# Patient Record
Sex: MALE | Race: WHITE | Employment: OTHER | URBAN - METROPOLITAN AREA
[De-identification: names, ages, dates, MRNs, and addresses within clinical notes are randomized per-mention and may not be internally consistent; named-entity substitution may affect disease eponyms.]

---

## 2017-01-16 ENCOUNTER — OFFICE VISIT (OUTPATIENT)
Dept: INTERNAL MEDICINE CLINIC | Age: 62
End: 2017-01-16

## 2017-01-16 ENCOUNTER — TELEPHONE (OUTPATIENT)
Dept: INTERNAL MEDICINE CLINIC | Age: 62
End: 2017-01-16

## 2017-01-16 VITALS
HEART RATE: 75 BPM | OXYGEN SATURATION: 97 % | BODY MASS INDEX: 31.61 KG/M2 | DIASTOLIC BLOOD PRESSURE: 71 MMHG | HEIGHT: 68 IN | TEMPERATURE: 98.5 F | SYSTOLIC BLOOD PRESSURE: 115 MMHG | WEIGHT: 208.6 LBS

## 2017-01-16 DIAGNOSIS — R00.2 PALPITATION: Primary | ICD-10-CM

## 2017-01-16 DIAGNOSIS — R07.89 OTHER CHEST PAIN: ICD-10-CM

## 2017-01-16 NOTE — TELEPHONE ENCOUNTER
I called and spoke with patient. Pt scheduled for 1030 today with Dr Darin Bright for htn and dizziness.

## 2017-01-16 NOTE — PROGRESS NOTES
HISTORY OF PRESENT ILLNESS  Kendra Holguin is a 64 y.o. male. HPI   Had episode os dizziness and syncope yesterday  Passed out, EMT arrived. BP was high  Pt declined ED evaluation. bp was 174/84 and felt flushed  Was dizzy when he awoke that morning and was stressed and took a valium tab  Was breathing heavy yesterday  Has had palpitations intermittently x months with vague pressure sensation  Has been anxous and stressed about some business deals and has had to take valium prn     Patient Active Problem List    Diagnosis Date Noted    PATRICIA (obstructive sleep apnea) 12/06/2009    IBS (irritable bowel syndrome) 12/06/2009    Diverticulosis 12/06/2009    Overweight(278.02) 12/06/2009    PUD (peptic ulcer disease) 12/06/2009    Rectal fissure 12/06/2009    Acne 12/06/2009     Current Outpatient Prescriptions   Medication Sig Dispense Refill    tadalafil (CIALIS) 5 mg tablet Take 1 Tab by mouth as needed. 30 Tab 11    zolpidem (AMBIEN) 10 mg tablet TAKE 1 TABLET BY MOUTH EVERY NIGHT AT BEDTIME AS NEEDED 30 Tab 5    diazepam (VALIUM) 5 mg tablet Take 1 Tab by mouth every six (6) hours as needed for Anxiety. Max Daily Amount: 20 mg. 30 Tab 1    cholecalciferol, vitamin D3, (VITAMIN D3) 2,000 unit tab Take  by mouth.  omega-3 fatty acids-vitamin e (FISH OIL) 1,000 mg cap Take 1 Cap by mouth.  zinc 100 mg Tab Take  by mouth.  magnesium 100 mg Cap Take  by mouth.  calcium 100 mg Cap Take  by mouth.  multivitamin, stress formula (STRESS TAB) tablet Take 1 Tab by mouth daily.        No Known Allergies   Lab Results  Component Value Date/Time   Hemoglobin A1c 5.5 02/07/2013 10:22 AM   Hemoglobin A1c, External 5.6 08/28/2015   Glucose 88 05/08/2014 11:42 AM   LDL, calculated 84 05/08/2014 11:42 AM   Creatinine 0.76 05/08/2014 11:42 AM      Lab Results  Component Value Date/Time   Cholesterol, total 159 05/08/2014 11:42 AM   HDL Cholesterol 62 05/08/2014 11:42 AM   LDL, calculated 84 05/08/2014 11:42 AM   Triglyceride 66 05/08/2014 11:42 AM       Lab Results  Component Value Date/Time   GFR est  05/08/2014 11:42 AM   GFR est non- 05/08/2014 11:42 AM   Creatinine 0.76 05/08/2014 11:42 AM   BUN 13 05/08/2014 11:42 AM   Sodium 140 05/08/2014 11:42 AM   Potassium 5.0 05/08/2014 11:42 AM   Chloride 102 05/08/2014 11:42 AM   CO2 27 05/08/2014 11:42 AM         ROS    Physical Exam   Constitutional: He appears well-developed and well-nourished. No distress. Appears stated age   HENT:   Head: Normocephalic. Cardiovascular: Normal rate, regular rhythm and normal heart sounds. Exam reveals no gallop and no friction rub. No murmur heard. Pulmonary/Chest: Effort normal and breath sounds normal. No respiratory distress. He has no wheezes. He has no rales. He exhibits no tenderness. Abdominal: Soft. Musculoskeletal: He exhibits no edema. Neurological: He is alert. Psychiatric: He has a normal mood and affect. Nursing note and vitals reviewed. ASSESSMENT and PLAN  Loulou Borrego was seen today for hypertension and dizziness.     Diagnoses and all orders for this visit:    Palpitation  -     AMB POC EKG ROUTINE W/ 12 LEADS, INTER & REP--nsr, NST  -     REFERRAL TO CARDIOLOGY   Possible all r/t anxiety but should getr stress test/echo   Pt will go to ED for any CP, increased SOB or recurrent syncope    Other chest pain  -     AMB POC EKG ROUTINE W/ 12 LEADS, INTER & REP  -     REFERRAL TO CARDIOLOGY      Follow-up Disposition: Not on File

## 2017-01-16 NOTE — TELEPHONE ENCOUNTER
Pt had EMT come to home 1-15-17 for htn & dizziness. Pt didn't go to ED, but would like to be seen today by DR. Tc Connolly. I have his scheduled for tomorrow, but he would like to come in today if anything opens up.

## 2017-01-16 NOTE — MR AVS SNAPSHOT
Visit Information Date & Time Provider Department Dept. Phone Encounter #  
 1/16/2017 10:30 AM Pola Malhotra, [address] [phone] [id number] Your Appointments 6/1/2017 11:00 AM  
ROUTINE CARE with Pola Malhotra, [address] [facility]) Appt Note: 6 month follow up bp  
 [address] [address]  
[address] [facility] [address] [place] [address] Upcoming Health Maintenance Date Due DTaP/Tdap/Td series (2 - Td) 2/7/2024 COLONOSCOPY 2/10/2025 Allergies as of 1/16/2017  Review Complete On: 1/16/2017 By: Emmett Champion LPN No Known Allergies Current Immunizations  Reviewed on 2/7/2014 Name Date Influenza Vaccine 10/26/2014 Influenza Vaccine (Quad) PF 12/15/2015 Influenza Vaccine PF 10/29/2013 Influenza Vaccine Split 12/9/2011 Influenza Vaccine Whole 11/2/2007 Tdap 2/7/2014 Zoster Vaccine, Live 11/7/2013 Not reviewed this visit You Were Diagnosed With   
  
 Codes Comments Palpitation    -  Primary ICD-10-CM: R00.2 ICD-9-CM: 785.1 Other chest pain     ICD-10-CM: R07.89 ICD-9-CM: 786.59 Vitals BP Pulse Temp Height(growth percentile) Weight(growth percentile) SpO2  
 115/71 (BP 1 Location: Left arm, BP Patient Position: Sitting) 75 98.5 °F (36.9 °C) (Oral) 5' 8\" (1.727 m) 208 lb 9.6 oz (94.6 kg) 97% BMI Smoking Status 31.72 kg/m2 Former Smoker BMI and BSA Data Body Mass Index Body Surface Area 31.72 kg/m 2 2.13 m 2 Preferred Pharmacy Pharmacy Name Phone [phone] - [phone], [address] AT [facility] 91 [phone] Your Updated Medication List  
  
   
This list is accurate as of: 1/16/17 11:46 AM.  Always use your most recent med list.  
  
  
  
  
 calcium 100 mg Cap Take  by mouth. diazePAM 5 mg tablet Commonly known as:  VALIUM Take 1 Tab by mouth every six (6) hours as needed for Anxiety. Max Daily Amount: 20 mg. FISH OIL 1,000 mg Cap Generic drug:  omega-3 fatty acids-vitamin e Take 1 Cap by mouth.  
  
 magnesium 100 mg Cap Take  by mouth.  
  
 multivitamin, stress formula tablet Commonly known as:  STRESS TAB Take 1 Tab by mouth daily. tadalafil 5 mg tablet Commonly known as:  CIALIS Take 1 Tab by mouth as needed. VITAMIN D3 2,000 unit Tab Generic drug:  cholecalciferol (vitamin D3) Take  by mouth. zinc 100 mg Tab Take  by mouth.  
  
 zolpidem 10 mg tablet Commonly known as:  AMBIEN  
TAKE 1 TABLET BY MOUTH EVERY NIGHT AT BEDTIME AS NEEDED We Performed the Following AMB POC EKG ROUTINE W/ 12 LEADS, INTER & REP [67564 CPT(R)] REFERRAL TO CARDIOLOGY [VVB69 Custom] Comments:  
 Please evaluate patient for chest pain , palpitations. syncope Referral Information Referral ID Referred By Referred To  
  
 [id number] JENNIFER KOWALSKI MD   
   [address] [address] Phone: [phone] Fax: [phone] Visits Status Start Date End Date 1 New Request 1/16/17 1/16/18 If your referral has a status of pending review or denied, additional information will be sent to support the outcome of this decision. Introducing [facility]! [employer] introduces [employer] patient portal. Now you can access parts of your medical record, email your doctor's office, and request medication refills online. 1. In your internet browser, go to [email or web address] 2. Click on the First Time User? Click Here link in the Sign In box. You will see the New Member Sign Up page. 3. Enter your [employer] Access Code exactly as it appears below. You will not need to use this code after youve completed the sign-up process.  If you do not sign up before the expiration date, you must request a new code. · [employer] Access Code: [email or web address] Expires: 3/2/2017 12:22 PM 
 
4. Enter the last four digits of your Social Security Number (xxxx) and Date of Birth (mm/dd/yyyy) as indicated and click Submit. You will be taken to the next sign-up page. 5. Create a [employer] ID. This will be your [employer] login ID and cannot be changed, so think of one that is secure and easy to remember. 6. Create a [employer] password. You can change your password at any time. 7. Enter your Password Reset Question and Answer. This can be used at a later time if you forget your password. 8. Enter your e-mail address. You will receive e-mail notification when new information is available in [address] 9. Click Sign Up. You can now view and download portions of your medical record. 10. Click the Download Summary menu link to download a portable copy of your medical information. If you have questions, please visit the Frequently Asked Questions section of the [employer] website. Remember, [employer] is NOT to be used for urgent needs. For medical emergencies, dial 911. Now available from your iPhone and Android! Please provide this summary of care documentation to your next provider. Your primary care clinician is listed as Christen KOWALSKI. If you have any questions after today's visit, please call [phone].

## 2017-01-25 ENCOUNTER — HOSPITAL ENCOUNTER (OUTPATIENT)
Dept: NON INVASIVE DIAGNOSTICS | Age: 62
Discharge: HOME | End: 2017-01-25
Attending: INTERNAL MEDICINE
Payer: COMMERCIAL

## 2017-01-25 DIAGNOSIS — R55 SYNCOPE: ICD-10-CM

## 2017-01-25 PROCEDURE — 93225 XTRNL ECG REC<48 HRS REC: CPT

## 2017-01-27 NOTE — PROCEDURES
Norris Shiva Weaver, [address]   [place]       Name:  Cesar Zapata   MR#:  [id number]   :  1955   Account #:  [de-identified]        Date of Adm:  2017       PROCEDURE: Holter monitor. FINDINGS: Holter monitor was carried out between 2017 and   2017. The patient did not return a diary or report symptoms. During the recording, the patient had a minimum heart rate of 56 beats   per minute and an average heart rate of 77 beats per minute with a   maximum heart rate of 112 beats per minute. There were no significant   pauses. There were isolated premature ventricular contractions   numbering 126 and isolated supraventricular beats of 33 beats per   minute. The patient was studied because of syncope. There were no   electrocardiographic causes for syncope. SUMMARY:   1. Regular sinus rhythm is observed. 2. Isolated premature ventricular contractions. 3. Atrial premature contractions. 4. No electrocardiographic cause for the patient's syncope.         MD EVON Sanchez / ISAIAS   D:  2017   07:16   T:  2017   12:45   Job #:  [id number]

## 2017-03-13 RX ORDER — TADALAFIL 5 MG/1
5 TABLET ORAL AS NEEDED
Qty: 90 TAB | Refills: 3 | Status: SHIPPED | OUTPATIENT
Start: 2017-03-13

## 2017-03-13 NOTE — TELEPHONE ENCOUNTER
Patient states he needs a call back in reference to getting a prescription for Cialis 20 mg's for a 90 day supply. Patient requests a Hard Copy so he can have filled in [place] as patient states it's cheaper there. Please call to discuss & advise.  Thank you

## 2017-03-13 NOTE — TELEPHONE ENCOUNTER
I called and spoke with patient. Pt states that he would like a 90-day supply. He states that he will pay out of pocket for medication if needed. Pt requesting 90 tablets as he takes this daily. Rx pended. Please PRINT rx.

## 2017-04-12 ENCOUNTER — OFFICE VISIT (OUTPATIENT)
Dept: INTERNAL MEDICINE CLINIC | Age: 62
End: 2017-04-12

## 2017-04-12 VITALS
HEIGHT: 68 IN | SYSTOLIC BLOOD PRESSURE: 115 MMHG | HEART RATE: 64 BPM | WEIGHT: 216 LBS | TEMPERATURE: 98.1 F | DIASTOLIC BLOOD PRESSURE: 67 MMHG | BODY MASS INDEX: 32.74 KG/M2 | RESPIRATION RATE: 16 BRPM

## 2017-04-12 DIAGNOSIS — F51.01 PRIMARY INSOMNIA: ICD-10-CM

## 2017-04-12 DIAGNOSIS — R10.11 RUQ PAIN: ICD-10-CM

## 2017-04-12 DIAGNOSIS — J06.9 UPPER RESPIRATORY TRACT INFECTION, UNSPECIFIED TYPE: Primary | ICD-10-CM

## 2017-04-12 RX ORDER — AZITHROMYCIN 250 MG/1
250 TABLET, FILM COATED ORAL SEE ADMIN INSTRUCTIONS
Qty: 6 TAB | Refills: 0 | Status: SHIPPED | OUTPATIENT
Start: 2017-04-12 | End: 2017-04-17

## 2017-04-12 RX ORDER — LORATADINE 10 MG/1
10 TABLET ORAL
COMMUNITY

## 2017-04-12 RX ORDER — ZOLPIDEM TARTRATE 10 MG/1
TABLET ORAL
Qty: 30 TAB | Refills: 5 | Status: SHIPPED | OUTPATIENT
Start: 2017-04-12 | End: 2018-03-21 | Stop reason: SDUPTHER

## 2017-04-12 NOTE — MR AVS SNAPSHOT
Visit Information Date & Time Provider Department Dept. Phone Encounter #  
 4/12/2017 11:15 AM Sandra Hsu, [address] [facility] [phone] [id number] Follow-up Instructions Return if symptoms worsen or fail to improve. Your Appointments 6/1/2017 11:00 AM  
ROUTINE CARE with Sandra Hsu, [facility] [facility]) Appt Note: 6 month follow up bp  
 [facility] [address] [address]  
[address] [facility] [address] [place] [address] Upcoming Health Maintenance Date Due DTaP/Tdap/Td series (2 - Td) 2/7/2024 COLONOSCOPY 2/10/2025 Allergies as of 4/12/2017  Review Complete On: 4/12/2017 By: Samuel Norris LPN No Known Allergies Current Immunizations  Reviewed on 2/7/2014 Name Date Influenza Vaccine 10/26/2014 Influenza Vaccine (Quad) PF 12/15/2015 Influenza Vaccine PF 10/29/2013 Influenza Vaccine Split 12/9/2011 Influenza Vaccine Whole 11/2/2007 Tdap 2/7/2014 Zoster Vaccine, Live 11/7/2013 Not reviewed this visit You Were Diagnosed With   
  
 Codes Comments Upper respiratory tract infection, unspecified type    -  Primary ICD-10-CM: J06.9 ICD-9-CM: 465.9 Primary insomnia     ICD-10-CM: F51.01 
ICD-9-CM: 307.42   
 RUQ pain     ICD-10-CM: R10.11 ICD-9-CM: 789.01 Vitals BP Pulse Temp Resp Height(growth percentile) Weight(growth percentile)  
 115/67 (BP 1 Location: Left arm, BP Patient Position: Sitting) 64 98.1 °F (36.7 °C) (Oral) 16 5' 8\" (1.727 m) 216 lb (98 kg) BMI Smoking Status 32.84 kg/m2 Former Smoker BMI and BSA Data Body Mass Index Body Surface Area  
 32.84 kg/m 2 2.17 m 2 Preferred Pharmacy Pharmacy Name Phone [phone], [facility] [place] [address] [phone] Your Updated Medication List  
  
   
This list is accurate as of: 4/12/17 11:54 AM.  Always use your most recent med list.  
  
  
  
  
 azithromycin 250 mg tablet Commonly known as:  Yang Chavez Take 1 Tab by mouth See Admin Instructions for 5 days. calcium 100 mg Cap Take  by mouth. diazePAM 5 mg tablet Commonly known as:  VALIUM Take 1 Tab by mouth every six (6) hours as needed for Anxiety. Max Daily Amount: 20 mg. FISH OIL 1,000 mg Cap Generic drug:  omega-3 fatty acids-vitamin e Take 1 Cap by mouth.  
  
 loratadine 10 mg tablet Commonly known as:  Suresh Hummer Take 10 mg by mouth.  
  
 magnesium 100 mg Cap Take  by mouth.  
  
 multivitamin, stress formula tablet Commonly known as:  STRESS TAB Take 1 Tab by mouth daily. tadalafil 5 mg tablet Commonly known as:  CIALIS Take 1 Tab by mouth as needed. VITAMIN D3 2,000 unit Tab Generic drug:  cholecalciferol (vitamin D3) Take  by mouth. zinc 100 mg Tab Take  by mouth.  
  
 zolpidem 10 mg tablet Commonly known as:  AMBIEN  
TAKE 1 TABLET BY MOUTH EVERY NIGHT AT BEDTIME AS NEEDED Prescriptions Printed Refills  
 zolpidem (AMBIEN) 10 mg tablet 5 Sig: TAKE 1 TABLET BY MOUTH EVERY NIGHT AT BEDTIME AS NEEDED Class: Print Prescriptions Sent to Pharmacy Refills  
 azithromycin (ZITHROMAX) 250 mg tablet 0 Sig: Take 1 Tab by mouth See Admin Instructions for 5 days. Class: Normal  
 Pharmacy: [facility] [address] [place] [address] #: [phone] Route: Oral  
  
Follow-up Instructions Return if symptoms worsen or fail to improve. To-Do List   
 04/17/2017 Imaging:  US ABD COMP Introducing [facility]!    
 [employer] introduces [employer] patient portal. Now you can access parts of your medical record, email your doctor's office, and request medication refills online. 1. In your internet browser, go to [email or web address] 2. Click on the First Time User? Click Here link in the Sign In box. You will see the New Member Sign Up page. 3. Enter your "[employer] Access Code exactly as it appears below. You will not need to use this code after youve completed the sign-up process. If you do not sign up before the expiration date, you must request a new code. · "[employer] Access Code: [email or web address] Expires: 7/11/2017 11:53 AM 
 
4. Enter the last four digits of your Social Security Number (xxxx) and Date of Birth (mm/dd/yyyy) as indicated and click Submit. You will be taken to the next sign-up page. 5. Create a "[employer] ID. This will be your "[employer] login ID and cannot be changed, so think of one that is secure and easy to remember. 6. Create a "[employer] password. You can change your password at any time. 7. Enter your Password Reset Question and Answer. This can be used at a later time if you forget your password. 8. Enter your e-mail address. You will receive e-mail notification when new information is available in [address] 9. Click Sign Up. You can now view and download portions of your medical record. 10. Click the Download Summary menu link to download a portable copy of your medical information. If you have questions, please visit the Frequently Asked Questions section of the "[employer] website. Remember, "[employer] is NOT to be used for urgent needs. For medical emergencies, dial 911. Now available from your iPhone and Android! Please provide this summary of care documentation to your next provider. Your primary care clinician is listed as Everardo KOWALSKI. If you have any questions after today's visit, please call [phone].

## 2017-04-12 NOTE — PROGRESS NOTES
HISTORY OF PRESENT ILLNESS  Kristin Jj is a 64 y.o. male. HPI     C/o sick x 10d  C/o fatigue, right ear feels stopped up and cough with green phlegm  flonase and mucinex and cough med which helps some  No f/c sob sxs  Still has intermittent RUQ pain and wants to get the US done that was ordered last year--needs new order. Patient Active Problem List    Diagnosis Date Noted    PATRICIA (obstructive sleep apnea) 12/06/2009    IBS (irritable bowel syndrome) 12/06/2009    Diverticulosis 12/06/2009    Overweight 12/06/2009    PUD (peptic ulcer disease) 12/06/2009    Rectal fissure 12/06/2009    Acne 12/06/2009     Current Outpatient Prescriptions   Medication Sig Dispense Refill    loratadine (CLARITIN) 10 mg tablet Take 10 mg by mouth.  zolpidem (AMBIEN) 10 mg tablet TAKE 1 TABLET BY MOUTH EVERY NIGHT AT BEDTIME AS NEEDED 30 Tab 5    azithromycin (ZITHROMAX) 250 mg tablet Take 1 Tab by mouth See Admin Instructions for 5 days. 6 Tab 0    tadalafil (CIALIS) 5 mg tablet Take 1 Tab by mouth as needed. 90 Tab 3    diazepam (VALIUM) 5 mg tablet Take 1 Tab by mouth every six (6) hours as needed for Anxiety. Max Daily Amount: 20 mg. 30 Tab 1    cholecalciferol, vitamin D3, (VITAMIN D3) 2,000 unit tab Take  by mouth.  omega-3 fatty acids-vitamin e (FISH OIL) 1,000 mg cap Take 1 Cap by mouth.  zinc 100 mg Tab Take  by mouth.  magnesium 100 mg Cap Take  by mouth.  calcium 100 mg Cap Take  by mouth.  multivitamin, stress formula (STRESS TAB) tablet Take 1 Tab by mouth daily. No Known Allergies        ROS    Physical Exam   Constitutional: He appears well-developed and well-nourished. No distress. Appears stated age   HENT:   Head: Normocephalic. Mouth/Throat: Oropharynx is clear and moist.   Neck: Normal range of motion. Cardiovascular: Normal rate, regular rhythm and normal heart sounds. Exam reveals no gallop and no friction rub. No murmur heard.   Pulmonary/Chest: Effort normal and breath sounds normal. No respiratory distress. He has no wheezes. He has no rales. He exhibits no tenderness. Abdominal: Soft. Musculoskeletal: He exhibits no edema. Lymphadenopathy:     He has no cervical adenopathy. Neurological: He is alert. Psychiatric: He has a normal mood and affect. Nursing note and vitals reviewed. ASSESSMENT and PLAN  Rommel Downey was seen today for cough, fatigue, dry eye and ear fullness. Diagnoses and all orders for this visit:    Upper respiratory tract infection, unspecified type    Primary insomnia  -     zolpidem (AMBIEN) 10 mg tablet; TAKE 1 TABLET BY MOUTH EVERY NIGHT AT BEDTIME AS NEEDED    RUQ pain  -     US ABD COMP; Future    Other orders  -     azithromycin (ZITHROMAX) 250 mg tablet; Take 1 Tab by mouth See Admin Instructions for 5 days. Follow-up Disposition:  Return if symptoms worsen or fail to improve.

## 2017-04-12 NOTE — PROGRESS NOTES
Reviewed record in preparation for visit and have obtained necessary documentation. Identified pt with two pt identifiers(name and ). Chief Complaint   Patient presents with    Cough     productive    Fatigue    Dry Eye     bilateral    Ear Fullness     right feels clogged       There are no preventive care reminders to display for this patient. Mr. Farzaneh Vega has a reminder for a \"due or due soon\" health maintenance. I have asked that he discuss health maintenance topic(s) due with His  primary care provider. Coordination of Care Questionnaire:  :     1) Have you been to an emergency room, urgent care clinic since your last visit? no   Hospitalized since your last visit? no             2) Have you seen or consulted any other health care providers outside of [facility] since your last visit? no  (Include any pap smears or colon screenings in this section.)      Patient is accompanied by self I have received verbal consent from Benoit Palacio to discuss any/all medical information while they are present in the room.

## 2017-06-15 ENCOUNTER — OFFICE VISIT (OUTPATIENT)
Dept: INTERNAL MEDICINE CLINIC | Age: 62
End: 2017-06-15

## 2017-06-15 VITALS
TEMPERATURE: 97.6 F | HEART RATE: 63 BPM | DIASTOLIC BLOOD PRESSURE: 71 MMHG | OXYGEN SATURATION: 96 % | SYSTOLIC BLOOD PRESSURE: 129 MMHG | HEIGHT: 68 IN | BODY MASS INDEX: 32.13 KG/M2 | WEIGHT: 212 LBS

## 2017-06-15 DIAGNOSIS — F41.9 ANXIETY: ICD-10-CM

## 2017-06-15 DIAGNOSIS — G47.33 OSA (OBSTRUCTIVE SLEEP APNEA): ICD-10-CM

## 2017-06-15 DIAGNOSIS — K76.0 FATTY LIVER: ICD-10-CM

## 2017-06-15 DIAGNOSIS — E66.9 OBESITY, UNSPECIFIED OBESITY SEVERITY, UNSPECIFIED OBESITY TYPE: Primary | ICD-10-CM

## 2017-06-15 RX ORDER — DIAZEPAM 5 MG/1
5 TABLET ORAL
Qty: 30 TAB | Refills: 1 | Status: SHIPPED | OUTPATIENT
Start: 2017-06-15 | End: 2018-03-21 | Stop reason: SDUPTHER

## 2017-06-15 NOTE — PROGRESS NOTES
HISTORY OF PRESENT ILLNESS  Chi Ruvalcaba is a 64 y.o. male. HPI     F/u syncope and RUQ pain  Had stress echo and echo and holter-negative and visit with Dr Sofia Gonzales  No recurrence of palpitations and syncope  Wants to see sleep med MD for PATRICIA and wants to see Dr Musa Gutierrez and [address] abdomen was done to RUW pain--fatty liver only      Patient Active Problem List    Diagnosis Date Noted    Fatty liver 06/15/2017    PATRICIA (obstructive sleep apnea) 12/06/2009    IBS (irritable bowel syndrome) 12/06/2009    Diverticulosis 12/06/2009    Overweight 12/06/2009    PUD (peptic ulcer disease) 12/06/2009    Rectal fissure 12/06/2009    Acne 12/06/2009     Current Outpatient Prescriptions   Medication Sig Dispense Refill    diazePAM (VALIUM) 5 mg tablet Take 1 Tab by mouth every six (6) hours as needed for Anxiety. Max Daily Amount: 20 mg. 30 Tab 1    zolpidem (AMBIEN) 10 mg tablet TAKE 1 TABLET BY MOUTH EVERY NIGHT AT BEDTIME AS NEEDED 30 Tab 5    cholecalciferol, vitamin D3, (VITAMIN D3) 2,000 unit tab Take  by mouth.  omega-3 fatty acids-vitamin e (FISH OIL) 1,000 mg cap Take 1 Cap by mouth.  zinc 100 mg Tab Take  by mouth.  calcium 100 mg Cap Take  by mouth.  multivitamin, stress formula (STRESS TAB) tablet Take 1 Tab by mouth daily.  loratadine (CLARITIN) 10 mg tablet Take 10 mg by mouth.  tadalafil (CIALIS) 5 mg tablet Take 1 Tab by mouth as needed. 90 Tab 3    magnesium 100 mg Cap Take  by mouth.        No Known Allergies   Lab Results  Component Value Date/Time   WBC 8.1 05/08/2014 11:42 AM   HGB 15.7 05/08/2014 11:42 AM   HCT 45.9 05/08/2014 11:42 AM   PLATELET 891 17/61/2805 11:42 AM   MCV 90 05/08/2014 11:42 AM     Lab Results  Component Value Date/Time   Cholesterol, total 159 05/08/2014 11:42 AM   HDL Cholesterol 62 05/08/2014 11:42 AM   LDL, calculated 84 05/08/2014 11:42 AM   LDL-C, External 105 08/28/2015   Triglyceride 66 05/08/2014 11:42 AM     Lab Results  Component Value Date/Time   ALT (SGPT) 22 05/08/2014 11:42 AM   AST (SGOT) 22 05/08/2014 11:42 AM   Alk. phosphatase 74 05/08/2014 11:42 AM   Bilirubin, total 0.4 05/08/2014 11:42 AM   Albumin 4.2 05/08/2014 11:42 AM   Protein, total 6.4 05/08/2014 11:42 AM   INR 1.0 07/16/2009 10:30 AM   Prothrombin time 10.1 07/16/2009 10:30 AM   PLATELET 754 12/11/1431 11:42 AM       Lab Results  Component Value Date/Time   GFR est non- 05/08/2014 11:42 AM   GFR est  05/08/2014 11:42 AM   Creatinine 0.76 05/08/2014 11:42 AM   BUN 13 05/08/2014 11:42 AM   Sodium 140 05/08/2014 11:42 AM   Potassium 5.0 05/08/2014 11:42 AM   Chloride 102 05/08/2014 11:42 AM   CO2 27 05/08/2014 11:42 AM        ROS    Physical Exam   Constitutional: He appears well-developed and well-nourished. No distress. Appears stated age   HENT:   Head: Normocephalic. Cardiovascular: Normal rate and regular rhythm. Exam reveals no gallop and no friction rub. No murmur heard. Pulmonary/Chest: Effort normal and breath sounds normal. No respiratory distress. He has no wheezes. He has no rales. He exhibits no tenderness. Abdominal: Soft. Musculoskeletal: He exhibits no edema. Neurological: He is alert. Psychiatric: He has a normal mood and affect. Nursing note and vitals reviewed. ASSESSMENT and PLAN  Wang Patel was seen today for hypertension, generalized body aches and fatigue. Diagnoses and all orders for this visit:    Obesity, unspecified obesity severity, unspecified obesity type  -     REFERRAL TO DIETITIAN    PATRICIA (obstructive sleep apnea)  -     SLEEP MEDICINE REFERRAL-may need new CPAP machine    Fatty liver   Normal LFT's   See dietician and work on further weight reduction    Anxiety   Refill valium, use sparingly-discussed  Other orders  -     Cancel: REFERRAL TO GENERAL SURGERY  -     diazePAM (VALIUM) 5 mg tablet; Take 1 Tab by mouth every six (6) hours as needed for Anxiety.  Max Daily Amount: 20 mg.      Follow-up Disposition:  Return in about 1 year (around 6/15/2018) for cpe.

## 2017-06-15 NOTE — MR AVS SNAPSHOT
Visit Information Date & Time Provider Department Dept. Phone Encounter #  
 6/15/2017  2:30 PM Luis Marrufo, [address] [place] [address],[address] [phone] [id number] Follow-up Instructions Return in about 1 year (around 6/15/2018) for cpe. Upcoming Health Maintenance Date Due INFLUENZA AGE 9 TO ADULT 8/1/2017 DTaP/Tdap/Td series (2 - Td) 2/7/2024 COLONOSCOPY 2/10/2025 Allergies as of 6/15/2017  Review Complete On: 6/15/2017 By: Luis Marrufo MD  
 No Known Allergies Current Immunizations  Reviewed on 2/7/2014 Name Date Influenza Vaccine 10/26/2014 Influenza Vaccine (Quad) PF 12/15/2015 Influenza Vaccine PF 10/29/2013 Influenza Vaccine Split 12/9/2011 Influenza Vaccine Whole 11/2/2007 Tdap 2/7/2014 Zoster Vaccine, Live 11/7/2013 Not reviewed this visit You Were Diagnosed With   
  
 Codes Comments Obesity, unspecified obesity severity, unspecified obesity type    -  Primary ICD-10-CM: E66.9 ICD-9-CM: 278.00   
 PATRICIA (obstructive sleep apnea)     ICD-10-CM: G47.33 
ICD-9-CM: 327.23 Fatty liver     ICD-10-CM: K76.0 ICD-9-CM: 571.8 Anxiety     ICD-10-CM: F41.9 ICD-9-CM: 300.00 Vitals BP Pulse Temp Height(growth percentile) Weight(growth percentile) SpO2  
 129/71 (BP 1 Location: Left arm, BP Patient Position: Sitting) 63 97.6 °F (36.4 °C) (Oral) 5' 8\" (1.727 m) 212 lb (96.2 kg) 96% BMI Smoking Status 32.23 kg/m2 Former Smoker BMI and BSA Data Body Mass Index Body Surface Area  
 32.23 kg/m 2 2.15 m 2 Preferred Pharmacy [phone], [address] AT [place] [address] [phone] Your Updated Medication List  
  
   
This list is accurate as of: 6/15/17  3:44 PM.  Always use your most recent med list.  
  
  
  
  
 calcium 100 mg Cap Take  by mouth. diazePAM 5 mg tablet Commonly known as:  VALIUM Take 1 Tab by mouth every six (6) hours as needed for Anxiety. Max Daily Amount: 20 mg. FISH OIL 1,000 mg Cap Generic drug:  omega-3 fatty acids-vitamin e Take 1 Cap by mouth.  
  
 loratadine 10 mg tablet Commonly known as:  Al Alvine Take 10 mg by mouth.  
  
 magnesium 100 mg Cap Take  by mouth.  
  
 multivitamin, stress formula tablet Commonly known as:  STRESS TAB Take 1 Tab by mouth daily. tadalafil 5 mg tablet Commonly known as:  CIALIS Take 1 Tab by mouth as needed. VITAMIN D3 2,000 unit Tab Generic drug:  cholecalciferol (vitamin D3) Take  by mouth. zinc 100 mg Tab Take  by mouth.  
  
 zolpidem 10 mg tablet Commonly known as:  AMBIEN  
TAKE 1 TABLET BY MOUTH EVERY NIGHT AT BEDTIME AS NEEDED Prescriptions Printed Refills  
 diazePAM (VALIUM) 5 mg tablet 1 Sig: Take 1 Tab by mouth every six (6) hours as needed for Anxiety. Max Daily Amount: 20 mg.  
 Class: Print Route: Oral  
  
We Performed the Following REFERRAL TO DIETITIAN [DMW86 Custom] Comments:  
 Please evaluate patient for obesity SLEEP MEDICINE REFERRAL [YUJ280 Custom] Comments:  
 Please evaluate patient for sleee apnea Follow-up Instructions Return in about 1 year (around 6/15/2018) for cpe. Referral Information Referral ID Referred By Referred To  
  
 [id number] JENNIFER KOWALSKI Not Available Visits Status Start Date End Date 1 New Request 6/15/17 6/15/18 If your referral has a status of pending review or denied, additional information will be sent to support the outcome of this decision. Referral ID Referred By Referred To  
 [id number] JENNIFER KOAWLSKI Not Available Visits Status Start Date End Date 1 New Request 6/15/17 6/15/18 If your referral has a status of pending review or denied, additional information will be sent to support the outcome of this decision. Introducing [facility]! Chace Kitchen introduces [employer] patient portal. Now you can access parts of your medical record, email your doctor's office, and request medication refills online. 1. In your internet browser, go to [email or web address]. [employer] 2. Click on the First Time User? Click Here link in the Sign In box. You will see the New Member Sign Up page. 3. Enter your [employer] Access Code exactly as it appears below. You will not need to use this code after youve completed the sign-up process. If you do not sign up before the expiration date, you must request a new code. · [employer] Access Code: [email or web address] Expires: 7/11/2017 11:53 AM 
 
4. Enter the last four digits of your Social Security Number (xxxx) and Date of Birth (mm/dd/yyyy) as indicated and click Submit. You will be taken to the next sign-up page. 5. Create a [employer] ID. This will be your [employer] login ID and cannot be changed, so think of one that is secure and easy to remember. 6. Create a [employer] password. You can change your password at any time. 7. Enter your Password Reset Question and Answer. This can be used at a later time if you forget your password. 8. Enter your e-mail address. You will receive e-mail notification when new information is available in [address] 9. Click Sign Up. You can now view and download portions of your medical record. 10. Click the Download Summary menu link to download a portable copy of your medical information. If you have questions, please visit the Frequently Asked Questions section of the [employer] website. Remember, [employer] is NOT to be used for urgent needs. For medical emergencies, dial 911. Now available from your iPhone and Android! Please provide this summary of care documentation to your next provider. Your primary care clinician is listed as Larry KOWALSKI. If you have any questions after today's visit, please call [phone].

## 2017-06-15 NOTE — ACP (ADVANCE CARE PLANNING)
Patient was given forms of Adv. Care Plan to take home to discuss with family and to fill out and return .

## 2017-07-05 DIAGNOSIS — R10.11 RIGHT UPPER QUADRANT ABDOMINAL PAIN: ICD-10-CM

## 2018-07-13 DIAGNOSIS — F41.9 ANXIETY: ICD-10-CM

## 2018-07-13 RX ORDER — DIAZEPAM 5 MG/1
TABLET ORAL
Qty: 30 TAB | Refills: 0 | Status: SHIPPED | OUTPATIENT
Start: 2018-07-13

## 2018-07-17 ENCOUNTER — TELEPHONE (OUTPATIENT)
Dept: INTERNAL MEDICINE CLINIC | Age: 63
End: 2018-07-17

## 2021-08-25 ENCOUNTER — TRANSCRIBE ORDER (OUTPATIENT)
Dept: SCHEDULING | Age: 66
End: 2021-08-25

## 2021-08-25 DIAGNOSIS — K59.00 CONSTIPATION: Primary | ICD-10-CM

## 2021-08-26 ENCOUNTER — HOSPITAL ENCOUNTER (OUTPATIENT)
Dept: ULTRASOUND IMAGING | Age: 66
Discharge: HOME | End: 2021-08-26
Attending: NURSE PRACTITIONER
Payer: MEDICARE

## 2021-08-26 DIAGNOSIS — K59.00 CONSTIPATION: ICD-10-CM

## 2021-08-26 PROCEDURE — 76700 US EXAM ABDOM COMPLETE: CPT

## 2022-03-19 PROBLEM — K76.0 FATTY LIVER: Status: ACTIVE | Noted: 2017-06-15

## 2023-03-13 ENCOUNTER — HOSPITAL ENCOUNTER (OUTPATIENT)
Dept: GENERAL RADIOLOGY | Age: 68
Discharge: HOME | End: 2023-03-13
Payer: MEDICARE

## 2023-03-13 ENCOUNTER — TRANSCRIBE ORDER (OUTPATIENT)
Dept: REGISTRATION | Age: 68
End: 2023-03-13

## 2023-03-13 DIAGNOSIS — R06.02 SHORTNESS OF BREATH: ICD-10-CM

## 2023-03-13 DIAGNOSIS — R06.02 SHORTNESS OF BREATH: Primary | ICD-10-CM

## 2023-03-13 PROCEDURE — 71046 X-RAY EXAM CHEST 2 VIEWS: CPT

## 2023-05-10 RX ORDER — LORATADINE 10 MG/1
10 TABLET ORAL
COMMUNITY

## 2023-05-10 RX ORDER — ZOLPIDEM TARTRATE 10 MG/1
TABLET ORAL
COMMUNITY
Start: 2018-03-21

## 2023-05-10 RX ORDER — TADALAFIL 5 MG/1
5 TABLET ORAL PRN
COMMUNITY
Start: 2017-03-13

## 2023-05-10 RX ORDER — DIAZEPAM 5 MG/1
1 TABLET ORAL EVERY 6 HOURS PRN
COMMUNITY
Start: 2018-07-13

## 2025-02-10 ENCOUNTER — TRANSCRIBE ORDERS (OUTPATIENT)
Facility: HOSPITAL | Age: 70
End: 2025-02-10

## 2025-02-10 DIAGNOSIS — R63.4 WEIGHT LOSS: ICD-10-CM

## 2025-02-10 DIAGNOSIS — R10.84 ABDOMINAL PAIN, GENERALIZED: Primary | ICD-10-CM
